# Patient Record
Sex: FEMALE | Race: OTHER
[De-identification: names, ages, dates, MRNs, and addresses within clinical notes are randomized per-mention and may not be internally consistent; named-entity substitution may affect disease eponyms.]

---

## 2019-11-26 ENCOUNTER — APPOINTMENT (OUTPATIENT)
Dept: SPINE | Facility: CLINIC | Age: 59
End: 2019-11-26
Payer: MEDICAID

## 2019-11-26 VITALS
DIASTOLIC BLOOD PRESSURE: 68 MMHG | OXYGEN SATURATION: 98 % | HEIGHT: 60 IN | SYSTOLIC BLOOD PRESSURE: 119 MMHG | TEMPERATURE: 98.2 F | HEART RATE: 79 BPM | WEIGHT: 140 LBS | BODY MASS INDEX: 27.48 KG/M2

## 2019-11-26 DIAGNOSIS — F17.210 NICOTINE DEPENDENCE, CIGARETTES, UNCOMPLICATED: ICD-10-CM

## 2019-11-26 DIAGNOSIS — M43.17 SPONDYLOLISTHESIS, LUMBOSACRAL REGION: ICD-10-CM

## 2019-11-26 DIAGNOSIS — M54.16 RADICULOPATHY, LUMBAR REGION: ICD-10-CM

## 2019-11-26 DIAGNOSIS — F17.200 NICOTINE DEPENDENCE, UNSPECIFIED, UNCOMPLICATED: ICD-10-CM

## 2019-11-26 DIAGNOSIS — M48.062 SPINAL STENOSIS, LUMBAR REGION WITH NEUROGENIC CLAUDICATION: ICD-10-CM

## 2019-11-26 DIAGNOSIS — Z83.3 FAMILY HISTORY OF DIABETES MELLITUS: ICD-10-CM

## 2019-11-26 DIAGNOSIS — Z86.39 PERSONAL HISTORY OF OTHER ENDOCRINE, NUTRITIONAL AND METABOLIC DISEASE: ICD-10-CM

## 2019-11-26 PROBLEM — Z00.00 ENCOUNTER FOR PREVENTIVE HEALTH EXAMINATION: Status: ACTIVE | Noted: 2019-11-26

## 2019-11-26 PROCEDURE — 99204 OFFICE O/P NEW MOD 45 MIN: CPT

## 2019-11-26 RX ORDER — DOCUSATE SODIUM 100 MG/1
100 CAPSULE, LIQUID FILLED ORAL
Refills: 0 | Status: ACTIVE | COMMUNITY

## 2019-11-26 RX ORDER — PRAVASTATIN SODIUM 80 MG/1
TABLET ORAL
Refills: 0 | Status: ACTIVE | COMMUNITY

## 2019-11-26 RX ORDER — METFORMIN HYDROCHLORIDE 500 MG/1
500 TABLET, COATED ORAL
Refills: 0 | Status: ACTIVE | COMMUNITY

## 2019-11-26 RX ORDER — MELOXICAM 15 MG/1
TABLET ORAL
Refills: 0 | Status: ACTIVE | COMMUNITY

## 2019-11-26 RX ORDER — METHOCARBAMOL 750 MG/1
TABLET, FILM COATED ORAL
Refills: 0 | Status: ACTIVE | COMMUNITY

## 2019-11-26 NOTE — HISTORY OF PRESENT ILLNESS
[> 3 months] : more  than 3 months [FreeTextEntry1] : Lower back Pain ; Bilateral Leg pain [de-identified] : This is a 59 Year old woman here for comprehensive neurosurgical evaluation of Chronic Lower back pain and Bilateral leg pain accompanied by profound difficulty walking. She is [place] speaking and her daughter comes to visit with her. Dr. Agrawal Speaks [place] as well. The daughter reports that the pain syndrome started 3 years ago after a fall. Over the years her symptoms have progressed and worsened. he initial diagnosed included sciatica and she has failed all conservative treatments. At this time she continue to struggle with all aspects of ADLs. \par \par \par  [Pain] : pain [Weakness] : weakness [Numbness] : numbness [Worsening] : worsening [10] : currently ~his/her~ pain is 10 out of 10 [Standing] : standing [Constant] : ~He/She~ states the symptoms seem to be constant [Bending] : worsened by bending [Lifting] : worsened by lifting [Prolonged Sitting] : worsened by prolonged sitting [Prolonged Standing] : worsened by prolonged standing [Walking] : worsened by walking [Sitting] : worsened by sitting [Chiropractic] : not relieved by chiropractic manipulation [Acetaminophen] : not relieved by acetaminophen [Acupuncture] : not relieved by acupuncture [Heat] : relieved by heat [NSAIDs] : not relieved by nonsteroidal anti-inflammatory drugs [Exercise Regimen] : not relieved by exercise regimen [Physical Therapy] : not relieved by physical therapy [Rest] : relieved by rest [Incontinence] : no incontinence [Ataxia] : no ataxia [Loss of Dexterity] : good dexterity [Urinary Ret.] : no urinary retention

## 2019-11-26 NOTE — PHYSICAL EXAM
[General Appearance - Alert] : alert [Impaired Insight] : insight and judgment were intact [General Appearance - In No Acute Distress] : in no acute distress [Oriented To Time, Place, And Person] : oriented to person, place, and time [I: Normal Smell] : smell intact bilaterally [Cranial Nerves Optic (II)] : visual acuity intact bilaterally,  pupils equal round and reactive to light [Cranial Nerves Oculomotor (III)] : extraocular motion intact [Cranial Nerves Trigeminal (V)] : facial sensation intact symmetrically [Cranial Nerves Facial (VII)] : face symmetrical [Cranial Nerves Vestibulocochlear (VIII)] : hearing was intact bilaterally [Cranial Nerves Glossopharyngeal (IX)] : tongue and palate midline [Cranial Nerves Accessory (XI - Cranial And Spinal)] : head turning and shoulder shrug symmetric [Cranial Nerves Hypoglossal (XII)] : there was no tongue deviation with protrusion [Motor Tone] : muscle tone was normal in all four extremities [Sensation Tactile Decrease] : light touch was intact [Balance] : balance was intact [Limited Balance] : the patient's balance was impaired [2+] : Patella right 2+ [FreeTextEntry8] : AMBULATES WITH CANE [FreeTextEntry6] : Mild MOTOR WEKNESS BLE 4/5 [Straight-Leg Raise Test - Right] : straight leg raise of the right leg was positive [Straight-Leg Raise Test - Left] : straight leg raise of the left leg was positive [No Visual Abnormalities] : no visible abnormailities [Antalgic] : antalgic [Able to toe walk] : the patient was not able to toe walk [Able to heel walk] : the patient was not able to heel walk [Sclera] : the sclera and conjunctiva were normal [Intact] : all sensory within normal limits bilaterally [Outer Ear] : the ears and nose were normal in appearance [Heart Rate And Rhythm] : heart rate was normal and rhythm regular [] : the neck was supple [Neck Appearance] : the appearance of the neck was normal [Abdomen Soft] : soft [FreeTextEntry1] : Slow Antalgic Gait [Skin Color & Pigmentation] : normal skin color and pigmentation

## 2019-11-26 NOTE — ASSESSMENT
[FreeTextEntry1] : SURGERY RECOMMENDED:L4-L5 PLIF. Pt ready to proceed.\par She will need comprehensive medical workup with Echocardiogram.\par PST information provided. Explained to daughter that Erin Ovalles will coordinate the PST.\par Smoking cessation recommended.\par Education provided regarding plan of care.\par \par

## 2019-11-26 NOTE — REVIEW OF SYSTEMS
[Feeling Tired] : feeling tired [Feeling Poorly] : feeling poorly [Leg Weakness] : leg weakness [Numbness] : numbness [Poor Coordination] : poor coordination [Difficulty Walking] : difficulty walking [Tingling] : tingling [Abnormal Sensation] : an abnormal sensation [Limping] : limping [As Noted in HPI] : as noted in HPI [Nosebleeds] : nosebleeds [Sleep Disturbances] : sleep disturbances [Negative] : Musculoskeletal

## 2019-11-26 NOTE — DATA REVIEWED
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 tHIS DOES NOT CONTACT THE DISTAL CORD. 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

## 2021-10-18 ENCOUNTER — APPOINTMENT (OUTPATIENT)
Dept: NEUROLOGY | Facility: CLINIC | Age: 61
End: 2021-10-18

## 2022-01-03 ENCOUNTER — APPOINTMENT (OUTPATIENT)
Dept: NEUROLOGY | Facility: CLINIC | Age: 62
End: 2022-01-03